# Patient Record
(demographics unavailable — no encounter records)

---

## 2024-11-12 NOTE — ASSESSMENT
[FreeTextEntry1] : 79-year-old male with a hx of DU and duodenal stricture. Pt says that he is doing well and has no GI symptoms.    Plan: Will cut back on the Pantoprazole to 40 mg qod.  I spent 10 min in the exam room with the pt.

## 2024-11-12 NOTE — HISTORY OF PRESENT ILLNESS
[FreeTextEntry1] : 79-year-old male with a hx of DU and duodenal stricture. Pt says that he is doing well and has no GI symptoms.

## 2025-02-19 NOTE — HISTORY OF PRESENT ILLNESS
[FreeTextEntry1] : COVID , he fell so was hospitalized, no meds  COVID VACCINE FULL.  HPI interval 2025: 79-year-old male presents today for follow up visit. Memory has declined. He lives with his brother part time. He is having delusions- thought someone after him. He was having difficulty with phone service- due to lack of payment. He is having a harder time paying bills. He is no longer driving. His brother is concerned with his hygiene and changing clothing. He has seen a psychiatrist in [facility] in past. No recent falls or hospitalizations. Sleep is poor- difficulty falling asleep.  He falls asleep often in car and daytime immediately after he eats. Appetite is very good- he eats a lot as per brother. As per patient no depression or anxiety- brother is concerned.   HPI: 79-year-old male presents today for initial cognitive evaluation with his brother. Memory loss for years but got worse in last several months. he lives with his brother. He does not have any children but was previously . He completed two years of college. He is repeating things many times.  He has poor concentration and focus. He wears clothing time and time again, No hallucinations or delusions. No known family history of Dementia. No changes in behavior or personality. No recent falls or hospitalizations. He is having worsening stm loss and needing things explained for clarification. He walks a lot.    PMH: HTN, HLD   -Memory: STM loss  -Speech: ok  -Orientation: ok -Praxis: ok  -Decision making/Executive fx/Multitasking: ok  -Sleep: on and off   -Appetite: good  -Motor symptoms: None   -B/B: None  -Psychiatric symptoms: None (brother disagrees)     -Functional status:   [place] in Activities of Daily Livin. Bathing/Showerin  2. Dressin  3. Toiletin   4. Transferrin 5. Continence: 1  6: Feedin TOTAL:  6     [place] Instrumental Activities of Daily Living:  A. Ability to Use Telephone: 1   B. Shoppin C. Food Preparation:1    D. Housekeepin   E. Laundry:  1 F. Transportation: 1    G. 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

## 2025-02-19 NOTE — DATA REVIEWED
[de-identified] : EXAM: [id number] - MR BRAIN DEMENTIA W 3D#  - ORDERED BY:  JEANNETTEANALI ESCALANTE   PROCEDURE DATE:  08/20/2024    INTERPRETATION:  HISTORY: ARIA Baseline scan.  TECHNIQUE: Multiplanar multisequence MRI brain was acquired without the administration of intravenous contrast, as per dedicated ARIA protocol.  3D reconstruction including segmentation and volumetric assessment was performed utilizing an independent, dedicated workstation with Formatta software for enhanced diagnostic analysis. Image documentation with archival was performed.  COMPARISON: None available.  FINDINGS:  There are scattered T2/FLAIR hyperintense foci in the subcortical and periventricular white matter, nonspecific finding, favored to reflect sequelae of mild chronic microvascular ischemic disease (Fazekas grade 1).  There is cortical sulcal prominence related to underlying brain parenchymal volume loss, which appears particularly pronounced in the mesial temporal lobes bilaterally.  No acute infarction, intracranial hemorrhage or mass.  There is no evidence of hydrocephalus. There are no extra-axial fluid collections. The skull base flow voids are present.  The visualized intraorbital contents are normal. There are scattered mild mucosal inflammatory changes of the paranasal sinuses. The mastoid air cells are grossly clear. The visualized soft tissues and osseous structures appear unremarkable.  3D post-processing with segmentation and volumetric analysis were technically successful. General morphology and age-related atrophy reports were generated and archived in the PACS for referring physician use. 3D volumetric assessment suggests decreased hippocampal volumes, at the 1st percentile for age bilaterally.  Note, comparison to proprietary normative data is provided by the Neuroquant software and will depend on the calculated intracranial volume. The calculated intracranial volume (ICV) for this study was 1348 mL.   IMPRESSION:  Number of microhemorrhages: None  Superficial siderosis (Y/N): No  White matter changes (Fazekas score): Fazekas 1  --- End of Report ---

## 2025-02-19 NOTE — REVIEW OF SYSTEMS
[Memory Lapses or Loss] : memory loss [Decr. Concentrating Ability] : decreased concentrating ability [Repeating Questions] : repeated questioning about recent events [Negative] : Heme/Lymph [Difficulty with Language] : no ~M difficulty with language [Changed Thought Patterns] : no change in thought patterns [Facial Weakness] : no facial weakness [Arm Weakness] : no arm weakness [Hand Weakness] : no hand weakness [Leg Weakness] : no leg weakness [Poor Coordination] : good coordination [Difficulty Writing] : no difficulty writing [Difficulties in Speech] : no speech difficulties [Numbness] : no numbness [Tingling] : no tingling [Abnormal Sensation] : no abnormal sensation [Hypersensitivity] : no hypersensitivity [Seizures] : no convulsions [Dizziness] : no dizziness [Fainting] : no fainting [Lightheadedness] : no lightheadedness [Vertigo] : no vertigo [Cluster Headache] : no cluster headache [Migraine Headache] : no migraine headache [Tension Headache] : no tension-type headache [Difficulty Walking] : no difficulty walking [Inability to Walk] : able to walk [Ataxia] : no ataxia [Frequent Falls] : not falling [Limping] : not limping

## 2025-02-19 NOTE — ASSESSMENT
[FreeTextEntry1] : Assessment: 79-year-old male with pmh htn and hld. Independent in adls and iadls. He is more forgetful and having STM loss and poor retention of information. MMSE 22/30, poor recall of words. Able to write a sentence correctly. He placed wrong time in clock.   Diagnostic Impression:     -memory loss -forgetfulness  Plan: -Rule out reversible vs vascular causes -psychiatry referral- delusions  -B vitamins, lyme serology, TFT, RPR -basic labs  -Educated on importance of lifestyle modifications such as daily exercise, healthy balanced diet and good sleep habits -Discussed changing living arrangements to safer options

## 2025-02-19 NOTE — PHYSICAL EXAM
[General Appearance - Alert] : alert [Oriented To Time, Place, And Person] : oriented to person, place, and time [Affect] : the affect was normal [Person] : oriented to person [Place] : oriented to place [Time] : oriented to time [Remote Intact] : remote memory intact [Span Intact] : the attention span was normal [Naming Objects] : no difficulty naming common objects [Repeating Phrases] : no difficulty repeating a phrase [Writing A Sentence] : no difficulty writing a sentence [Fluency] : fluency intact [Comprehension] : comprehension intact [Reading] : reading intact [Current Events] : adequate knowledge of current events [Past History] : adequate knowledge of personal past history [Vocabulary] : adequate range of vocabulary [Total Score ___ / 30] : the patient achieved a score of [unfilled] /30 [Date / Time ___ / 5] : date / time [unfilled] / 5 [Place ___ / 5] : place [unfilled] / 5 [Registration ___ / 3] : registration [unfilled] / 3 [Serial Sevens ___/5] : serial sevens [unfilled] / 5 [Naming 2 Objects ___ / 2] : naming two objects [unfilled] / 2 [Repeating a Sentence ___ / 1] : repeating a sentence [unfilled] / 1 [Writing a Sentence ___ / 1] : write sentence [unfilled] / 1 [3-stage Verbal Command ___ / 3] : three-stage verbal command [unfilled] / 3 [Written Command ___ / 1] : written command [unfilled] / 1 [Copy a Design ___ / 1] : copy a design [unfilled] / 1 [Recall ___ / 3] : recall [unfilled] / 3 [Cranial Nerves Optic (II)] : visual acuity intact bilaterally,  visual fields full to confrontation, pupils equal round and reactive to light [Cranial Nerves Oculomotor (III)] : extraocular motion intact [Cranial Nerves Trigeminal (V)] : facial sensation intact symmetrically [Cranial Nerves Facial (VII)] : face symmetrical [Cranial Nerves Vestibulocochlear (VIII)] : hearing was intact bilaterally [Cranial Nerves Glossopharyngeal (IX)] : tongue and palate midline [Cranial Nerves Accessory (XI - Cranial And Spinal)] : head turning and shoulder shrug symmetric [Cranial Nerves Hypoglossal (XII)] : there was no tongue deviation with protrusion [Motor Tone] : muscle tone was normal in all four extremities [Motor Strength] : muscle strength was normal in all four extremities [Abnormal Walk] : normal gait [2+] : Ankle jerk left 2+ [Sclera] : the sclera and conjunctiva were normal [PERRL With Normal Accommodation] : pupils were equal in size, round, reactive to light, with normal accommodation [Extraocular Movements] : extraocular movements were intact [Full Visual Field] : full visual field [Short Term Intact] : short term memory impaired [Registration Intact] : recent registration memory impaired [Concentration Intact] : a decrease in concentrating ability was observed [Visual Intact] : visual attention was ~T ~L decreased [Motor Strength Upper Extremities Bilaterally] : strength was normal in both upper extremities [Motor Strength Lower Extremities Bilaterally] : strength was normal in both lower extremities [Romberg's Sign] : Romberg's sign was negtive [Limited Balance] : balance was intact [Tremor] : no tremor present [FreeTextEntry4] : Mental Status Exam   Presidents: 2/5 Alternating Pattern: ok  Spiral: ok Clock: 2/3 Repetition: ok Trail A: B:  Fluency: A: 10 Animals: 15   Go-No-Go: ok   R/L discrimination on self and examiner: difficulty  Cross-line commands: difficulty Praxis: ok - Motor: ok -Dynamic/Luria: difficulty  -Ideomotor/Imitation: ok   -Ideational/writing/closing-in: ok  -Dressing: ok [FreeTextEntry7] : mild paratonia

## 2025-02-19 NOTE — REASON FOR VISIT
[Follow-Up: _____] : a [unfilled] follow-up visit [Family Member] : family member [FreeTextEntry1] : cogntiive decline

## 2025-03-05 NOTE — PHYSICAL EXAM
[Alert] : alert [No Acute Distress] : in no acute distress [Sclera] : the sclera and conjunctiva were normal [EOMI] : extraocular movements were intact [PERRL] : pupils were equal in size, round, and reactive to light [Normal Outer Ear/Nose] : the ears and nose were normal in appearance [Normal Appearance] : the appearance of the neck was normal [Supple] : the neck was supple [No Respiratory Distress] : no respiratory distress [No Acc Muscle Use] : no accessory muscle use [Respiration, Rhythm And Depth] : normal respiratory rhythm and effort [Auscultation Breath Sounds / Voice Sounds] : lungs were clear to auscultation bilaterally [Normal S1, S2] : normal S1 and S2 [Heart Rate And Rhythm] : heart rate was normal and rhythm regular [Edema] : edema was not present [Bowel Sounds] : normal bowel sounds [Abdomen Tenderness] : non-tender [Abdomen Soft] : soft [No Spinal Tenderness] : no spinal tenderness [Normal Gait] : normal gait [Involuntary Movements] : no involuntary movements were seen [Motor Tone] : muscle strength and tone were normal [Normal Color / Pigmentation] : normal skin color and pigmentation [No Focal Deficits] : no focal deficits [Normal Affect] : the affect was normal [Normal Mood] : the mood was normal [de-identified] : Right hand swelling and tenderness. Reduced ROM in R Shoulder

## 2025-03-05 NOTE — REVIEW OF SYSTEMS
[Fever] : no fever [Chills] : no chills [Feeling Poorly] : not feeling poorly [Feeling Tired] : not feeling tired [Eyesight Problems] : no eyesight problems [Earache] : no earache [Loss Of Hearing] : no hearing loss [Nosebleeds] : no nosebleeds [Nasal Discharge] : no nasal discharge [Sore Throat] : no sore throat [Heart Rate Is Slow] : the heart rate was not slow [Heart Rate Is Fast] : the heart rate was not fast [Chest Pain] : no chest pain [Palpitations] : no palpitations [Lower Ext Edema] : no extremity edema [Shortness Of Breath] : no shortness of breath [Wheezing] : no wheezing [Cough] : no cough [SOB on Exertion] : no shortness of breath during exertion [Abdominal Pain] : no abdominal pain [Vomiting] : no vomiting [Constipation] : no constipation [Diarrhea] : no diarrhea [Arthralgias] : arthralgias [Joint Swelling] : joint swelling [As noted in HPI] : as noted in HPI [As Noted in HPI] : as noted in HPI [Negative] : Heme/Lymph

## 2025-03-05 NOTE — ASSESSMENT
[FreeTextEntry1] : RTC in 1 week for cognitive evaluation.  Total time spent: 76 mins This includes chart review, patient assessment, discussion and collaboration with interdisciplinary team members, excluding time spent on separately billable services.  Time spent discussing advance care plannin mins  EKG obtained to assist in diagnosis and management of assessed problem(s).

## 2025-03-05 NOTE — HISTORY OF PRESENT ILLNESS
[Patient reported vision is abnormal] : Patient reported vision is abnormal [No falls in past year] : Patient reported no falls in the past year [Completely Independent] : Completely independent. [Smoke Detector] : smoke detector [Carbon Monoxide Detector] : carbon monoxide detector [Grab Bars] : grab bars [Night Light] : night light [Anti-Slip Measures] : anti-slip measures [NO] : No [Little interest or pleasure doing things] : 1) Little interest or pleasure doing things [Feeling down, depressed, or hopeless] : 2) Feeling down, depressed, or hopeless [0] : 2) Feeling down, depressed, or hopeless: Not at all (0) [Designated Healthcare Proxy] : Designated healthcare proxy [Name: ___] : Health Care Proxy's Name: [unfilled]  [Relationship: ___] : Relationship: [unfilled] [Patient reported hearing was normal] : Patient reported hearing was normal [Patient reported Patient reported dental screening is normal] : Patient reported dental screening is normal [Patient is independent with] : bathing [] : managing medications [Independent] : managing finances [PHQ-2 Negative - No further assessment needed] : PHQ-2 Negative - No further assessment needed [I have developed a follow-up plan documented below in the note.] : I have developed a follow-up plan documented below in the note. [With Patient/Caregiver] : , with patient/caregiver [Reviewed updated] : Reviewed, updated [I will adhere to the patient's wishes.] : I will adhere to the patient's wishes. [Time Spent: ___ minutes] : Time Spent: [unfilled] minutes [FreeTextEntry1] : 78 y/o M with PMHx of HTN, HLD, duodenal strictures/ulcers, dementia presenting to the practice to establish care.   Accompanied by his brother Ten.   Previous PCP: Dr. Azael Shi [address] [place] [address] [phone]  RUE pain: Patient complains of R hand pain and swelling x 1 week and R Shoulder pain and reduced mobility x 1 month. Patient does not recall any trauma to the hand or shoulder. Brother endorses that he has been having trouble remembering events so it is possible that he does not remember the circumstances behind the swelling in his hand and pain in his shoulder.  HCM:  Discussed PCV and Shingrix with patient, he is unsure if he received these vaccines in the past and will bring his vaccination records at next visit. He wants to defer vaccinations to next visit.   HTN:  On Hyzaar 100-12.5 mg daily, BP in office was 165/75. Will check again at next visit and will add additional HTN medication. Denies chest pain, palpitations, SOB. No cardiac history otherwise. Has a history of bradycardia only.  HLD:  Continue lipitor 20 mg daily.   MCI: Following with neuro. Independant with I/ADL's, experiencing forgetfulness, short-term memory loss, poor retention of information. MMSE done with neuro at 21/30 in 2024. Had initial dementia w/u with neuro. Considered vascular vs reversible causes. Referred to psych, neuropsych. Neuropsych evaluation with primarily frontal systems dysfunction + additional mesial temporal dysfunction all consistent with MCI.   MR brain notable for scattered T2/FLAIR hyperintense foci in the subcortical and periventricular white matter, nonspecific finding, favored to reflect sequelae of mild chronic microvascular ischemic disease (Fazekas grade 1). There is cortical sulcal prominence related to underlying brain parenchymal volume loss, which appears particularly pronounced in the mesial temporal lobes bilaterally.  No acute infarction, intracranial hemorrhage or mass.  There is no evidence of hydrocephalus. There are no extra-axial fluid collections. The skull base flow voids are present.  Deconditioning: Referred to PT.  Duodenal stricture/ulcer: Following with GI. On pantoprazole 40 mg daily. [TextBox_37] : referral provided to ophtho  [Shower Chair] : no shower chair [Driving Concerns] : not driving or driving without noted concerns [[id number]] : 0 [AdvancecareDate] : 03/05/2025 [FreeTextEntry4] : Patient states that he would want his brother to be his HCP. Form signed. Discussed MOLST, he states that he will think about it.

## 2025-03-05 NOTE — PHYSICAL EXAM
[Alert] : alert [No Acute Distress] : in no acute distress [EOMI] : extraocular movements were intact [Sclera] : the sclera and conjunctiva were normal [PERRL] : pupils were equal in size, round, and reactive to light [Normal Outer Ear/Nose] : the ears and nose were normal in appearance [Normal Appearance] : the appearance of the neck was normal [Supple] : the neck was supple [No Respiratory Distress] : no respiratory distress [No Acc Muscle Use] : no accessory muscle use [Respiration, Rhythm And Depth] : normal respiratory rhythm and effort [Auscultation Breath Sounds / Voice Sounds] : lungs were clear to auscultation bilaterally [Normal S1, S2] : normal S1 and S2 [Heart Rate And Rhythm] : heart rate was normal and rhythm regular [Edema] : edema was not present [Bowel Sounds] : normal bowel sounds [Abdomen Tenderness] : non-tender [Abdomen Soft] : soft [No Spinal Tenderness] : no spinal tenderness [Normal Gait] : normal gait [Involuntary Movements] : no involuntary movements were seen [Motor Tone] : muscle strength and tone were normal [Normal Color / Pigmentation] : normal skin color and pigmentation [No Focal Deficits] : no focal deficits [Normal Affect] : the affect was normal [Normal Mood] : the mood was normal [de-identified] : Right hand swelling and tenderness. Reduced ROM in R Shoulder

## 2025-03-05 NOTE — HISTORY OF PRESENT ILLNESS
[Patient reported vision is abnormal] : Patient reported vision is abnormal [No falls in past year] : Patient reported no falls in the past year [Completely Independent] : Completely independent. [Smoke Detector] : smoke detector [Carbon Monoxide Detector] : carbon monoxide detector [Grab Bars] : grab bars [Night Light] : night light [Anti-Slip Measures] : anti-slip measures [NO] : No [Little interest or pleasure doing things] : 1) Little interest or pleasure doing things [Feeling down, depressed, or hopeless] : 2) Feeling down, depressed, or hopeless [0] : 2) Feeling down, depressed, or hopeless: Not at all (0) [Designated Healthcare Proxy] : Designated healthcare proxy [Name: ___] : Health Care Proxy's Name: [unfilled]  [Relationship: ___] : Relationship: [unfilled] [Patient reported hearing was normal] : Patient reported hearing was normal [Patient reported Patient reported dental screening is normal] : Patient reported dental screening is normal [Patient is independent with] : bathing [] : managing medications [Independent] : managing finances [PHQ-2 Negative - No further assessment needed] : PHQ-2 Negative - No further assessment needed [I have developed a follow-up plan documented below in the note.] : I have developed a follow-up plan documented below in the note. [With Patient/Caregiver] : , with patient/caregiver [Reviewed updated] : Reviewed, updated [I will adhere to the patient's wishes.] : I will adhere to the patient's wishes. [Time Spent: ___ minutes] : Time Spent: [unfilled] minutes [FreeTextEntry1] : 80 y/o M with PMHx of HTN, HLD, duodenal strictures/ulcers, dementia presenting to the practice to establish care.   Accompanied by his brother Ten.   Previous PCP: Dr. Azael Shi [address] [place] [address] [phone]  RUE pain: Patient complains of R hand pain and swelling x 1 week and R Shoulder pain and reduced mobility x 1 month. Patient does not recall any trauma to the hand or shoulder. Brother endorses that he has been having trouble remembering events so it is possible that he does not remember the circumstances behind the swelling in his hand and pain in his shoulder.  HCM:  Discussed PCV and Shingrix with patient, he is unsure if he received these vaccines in the past and will bring his vaccination records at next visit. He wants to defer vaccinations to next visit.   HTN:  On Hyzaar 100-12.5 mg daily, BP in office was 165/75. Will check again at next visit and will add additional HTN medication. Denies chest pain, palpitations, SOB. No cardiac history otherwise. Has a history of bradycardia only.  HLD:  Continue lipitor 20 mg daily.   MCI: Following with neuro. Independant with I/ADL's, experiencing forgetfulness, short-term memory loss, poor retention of information. MMSE done with neuro at 21/30 in 2024. Had initial dementia w/u with neuro. Considered vascular vs reversible causes. Referred to psych, neuropsych. Neuropsych evaluation with primarily frontal systems dysfunction + additional mesial temporal dysfunction all consistent with MCI.   MR brain notable for scattered T2/FLAIR hyperintense foci in the subcortical and periventricular white matter, nonspecific finding, favored to reflect sequelae of mild chronic microvascular ischemic disease (Fazekas grade 1). There is cortical sulcal prominence related to underlying brain parenchymal volume loss, which appears particularly pronounced in the mesial temporal lobes bilaterally.  No acute infarction, intracranial hemorrhage or mass.  There is no evidence of hydrocephalus. There are no extra-axial fluid collections. The skull base flow voids are present.  Deconditioning: Referred to PT.  Duodenal stricture/ulcer: Following with GI. On pantoprazole 40 mg daily. [TextBox_37] : referral provided to ophtho  [Shower Chair] : no shower chair [Driving Concerns] : not driving or driving without noted concerns [[id number]] : 0 [AdvancecareDate] : 03/05/2025 [FreeTextEntry4] : Patient states that he would want his brother to be his HCP. Form signed. Discussed MOLST, he states that he will think about it.

## 2025-03-07 NOTE — REASON FOR VISIT
[Initial Consultation] : an initial consultation for [Hand Pain] : hand pain [Family Member] : family member

## 2025-03-12 NOTE — PHYSICAL EXAM
[de-identified] : Patient is alert, oriented and in no acute distress. Affect and general appearance are normal and patient is able to answer questions appropriately.  Neurologic: Median, ulnar, and radial motor and sensory are intact.  Skin: No cyanosis, clubbing, edema or rashes.  Vascular: Radial pulses intact.  Lymphatic: No streaking or epitrochlear adenopathy.  Right palm: Extensive edematous changes. Pain with flexion and extension. There is pain at A1 Pulley. There is some swelling.

## 2025-03-12 NOTE — ASSESSMENT
[FreeTextEntry1] : Extensive discussion with patient and brother. Concern for fluctuance and abscess necessitating incision in office today.   Will need to perform daily soaks. Abx prescribed. Follow-up on Friday for evaluation

## 2025-03-12 NOTE — PHYSICAL EXAM
[de-identified] : Patient is alert, oriented and in no acute distress. Affect and general appearance are normal and patient is able to answer questions appropriately.  Neurologic: Median, ulnar, and radial motor and sensory are intact.  Skin: No cyanosis, clubbing, edema or rashes.  Vascular: Radial pulses intact.  Lymphatic: No streaking or epitrochlear adenopathy.  Right palm: Extensive edematous changes. Pain with flexion and extension. There is pain at A1 Pulley. There is some swelling.

## 2025-03-12 NOTE — HISTORY OF PRESENT ILLNESS
[FreeTextEntry1] : Patient is a 79 year old male who presents today with right hand pain and swelling x 3 weeks. He denies any inciting event and states that he woke up one day to diffuse and spontaneous swelling and pain of the right hand, worse over the middle finger. Over the course of the past 3 weeks, he endorses worsening of symptoms. He denies any numbness, tingling or lacerations to the hand.

## 2025-03-12 NOTE — PROCEDURE
[FreeTextEntry1] : Ultrasound obtained of the right middle finger evidence of extensive soft tissue edema. no vascular enhancement. Underlying bones and tendons appear normal.  Digit was sterilely prepped. Sharp dissection made over area of maximum fluctuance. Blunt dissection through subcutaneous tissues.  2 cc of purulence was encountered. Wound was copiously irrigated. Patient tolerated the procedure well without any complications.

## 2025-03-12 NOTE — REVIEW OF SYSTEMS
[Right] : right [Arthralgia] : no arthralgia [Joint Pain] : no joint pain [Joint Stiffness] : joint stiffness [Joint Swelling] : joint swelling [Negative] : Allergic/Immunologic

## 2025-03-13 NOTE — PHYSICAL EXAM
[de-identified] : Constitutional: Alert and in no acute distress. Psychiatric: Oriented to person, place, and time. Insight and judgement were intact and the affect was normal. Cardiovascular: Regular rate assessed through peripheral pulses. Pulmonary: Nonlabored breathing on room air. Lymphatics: No peripheral lymphadenopathy appreciated.  Musculoskeletal: Right hand there is decreased swelling. Increased ROM. Incision without erythema.

## 2025-03-13 NOTE — PHYSICAL EXAM
[de-identified] : Constitutional: Alert and in no acute distress. Psychiatric: Oriented to person, place, and time. Insight and judgement were intact and the affect was normal. Cardiovascular: Regular rate assessed through peripheral pulses. Pulmonary: Nonlabored breathing on room air. Lymphatics: No peripheral lymphadenopathy appreciated.  Musculoskeletal: Right hand there is decreased swelling. Increased ROM. Incision without erythema.

## 2025-03-13 NOTE — HISTORY OF PRESENT ILLNESS
[FreeTextEntry1] : The patient is doing well since the last visit. He has kept the dressing clean and intact and has started his been taking the prescribed antibiotic. He states that the pain and swelling have improved and feels that he has more ROM to the right hand. He denies any fevers or chills.

## 2025-04-11 NOTE — PHYSICAL EXAM
[de-identified] : Constitutional: Alert and in no acute distress. Psychiatric: Oriented to person, place, and time. Insight and judgement were intact and the affect was normal. Cardiovascular: Regular rate assessed through peripheral pulses. Pulmonary: Nonlabored breathing on room air. Lymphatics: No peripheral lymphadenopathy appreciated.  Musculoskeletal: Right hand: Incision is well healed and without erythema. there is decreased swelling. Increased ROM.

## 2025-04-11 NOTE — PHYSICAL EXAM
[de-identified] : Constitutional: Alert and in no acute distress. Psychiatric: Oriented to person, place, and time. Insight and judgement were intact and the affect was normal. Cardiovascular: Regular rate assessed through peripheral pulses. Pulmonary: Nonlabored breathing on room air. Lymphatics: No peripheral lymphadenopathy appreciated.  Musculoskeletal: Right hand: Incision is well healed and without erythema. there is decreased swelling. Increased ROM.

## 2025-04-11 NOTE — PHYSICAL EXAM
[de-identified] : Constitutional: Alert and in no acute distress. Psychiatric: Oriented to person, place, and time. Insight and judgement were intact and the affect was normal. Cardiovascular: Regular rate assessed through peripheral pulses. Pulmonary: Nonlabored breathing on room air. Lymphatics: No peripheral lymphadenopathy appreciated.  Musculoskeletal: Right hand: Incision is well healed and without erythema. there is decreased swelling. Increased ROM.

## 2025-04-11 NOTE — HISTORY OF PRESENT ILLNESS
[FreeTextEntry1] : The patient is doing well since the last visit. He admits to finishing his course of prescribed antibiotics. He states that the pain and swelling have improved and feels that he has more ROM to the right hand. He denies any fevers or chills.

## 2025-05-20 NOTE — PHYSICAL EXAM
[Alert] : alert [Sclera] : the sclera and conjunctiva were normal [EOMI] : extraocular movements were intact [PERRL] : pupils were equal in size, round, and reactive to light [Normal Outer Ear/Nose] : the ears and nose were normal in appearance [Normal Appearance] : the appearance of the neck was normal [Supple] : the neck was supple [No Respiratory Distress] : no respiratory distress [No Acc Muscle Use] : no accessory muscle use [Respiration, Rhythm And Depth] : normal respiratory rhythm and effort [Auscultation Breath Sounds / Voice Sounds] : lungs were clear to auscultation bilaterally [Normal S1, S2] : normal S1 and S2 [Heart Rate And Rhythm] : heart rate was normal and rhythm regular [Edema] : edema was not present [Bowel Sounds] : normal bowel sounds [Abdomen Tenderness] : non-tender [Abdomen Soft] : soft [No Spinal Tenderness] : no spinal tenderness [Involuntary Movements] : no involuntary movements were seen [Motor Tone] : muscle strength and tone were normal [Normal Color / Pigmentation] : normal skin color and pigmentation [No Focal Deficits] : no focal deficits [Normal Affect] : the affect was normal [Normal Mood] : the mood was normal [___ / 5] : Visuospatial / Executive: [unfilled] / 5 [0 / 0] : Memory: 0 / 0 [___ / 3] : Attention (Serial 7 subtraction): [unfilled] / 3 [___ / 1] : Fluency: [unfilled] / 1 [___ / 2] : Abstraction: [unfilled] / 2 [___ / 5] : Delayed Recall: [unfilled] / 5 [___ / 6] : Orientation: [unfilled] / 6 [Normal Gait] : abnormal gait [de-identified] : elderly male, slow speech  [de-identified] :  Reduced ROM in R Shoulder. Ataxic shuffled gait.  [MocaTotal] : 17 [FreeTextEntry1] : 3/11/25

## 2025-05-20 NOTE — HISTORY OF PRESENT ILLNESS
[Patient reported hearing was normal] : Patient reported hearing was normal [Patient reported vision is abnormal] : Patient reported vision is abnormal [Patient reported Patient reported dental screening is normal] : Patient reported dental screening is normal [No falls in past year] : Patient reported no falls in the past year [Patient is independent with] : bathing [Independent] : transferring/mobility [Completely Independent] : Completely independent. [Full assistance needed] : Assistance needed managing medications [FAST Score: ____] : Functional Assessment Scale (FAST) Score: [unfilled] [Smoke Detector] : smoke detector [Carbon Monoxide Detector] : carbon monoxide detector [Grab Bars] : grab bars [Night Light] : night light [Anti-Slip Measures] : anti-slip measures [NO] : No [Little interest or pleasure doing things] : 1) Little interest or pleasure doing things [Feeling down, depressed, or hopeless] : 2) Feeling down, depressed, or hopeless [0] : 2) Feeling down, depressed, or hopeless: Not at all (0) [PHQ-2 Negative - No further assessment needed] : PHQ-2 Negative - No further assessment needed [I have developed a follow-up plan documented below in the note.] : I have developed a follow-up plan documented below in the note. [] : 0 [With Patient/Caregiver] : , with patient/caregiver [Reviewed updated] : Reviewed, updated [Designated Healthcare Proxy] : Designated healthcare proxy [Name: ___] : Health Care Proxy's Name: [unfilled]  [Relationship: ___] : Relationship: [unfilled] [I will adhere to the patient's wishes.] : I will adhere to the patient's wishes. [Time Spent: ___ minutes] : Time Spent: [unfilled] minutes [FreeTextEntry1] : 78 y/o M with PMHx of HTN, HLD, duodenal strictures/ulcers, dementia presenting for a follow up for dementia care.  Accompanied by his brother Ten who assisted in hx intake due to dementia.    #dementia  - Mr. MARGOTH LEONG stated he does not think his memory is a problem  -Ten expressed concerns about his memory, delusions, lack of cleanliness "sloppy" -reports this has been going on for a long time  -delusions that he thinks that what he see's on TV being is real -reports he is sleeping on and off throughout the day  -enjoys walking  -lives with Ten part-time "increasingly more and more" -CSDD 3 3/11/25 -[facility] 17/30 3/11/25 -has left stove on  -payed 2 months of rents, forgetting to pay bills  -forgot to pay phone services  -spending most of the week with Ten -Ten reports worsening memory loss "he is forgetting to take his meds" -not using pill box -forgetting to take meds -not driving  -denies behavioral disturbances  -occasionally thinks that when he misplaces items, thinks someone is stealing them -denies agitation  -sleeping well  -gaining weight -denies falls/hospitalizations  Following with neuro. Independant with I/ADL's, experiencing forgetfulness, short-term memory loss, poor retention of information. MMSE done with neuro at 21/30 in 2024. Had initial dementia w/u with neuro. Considered vascular vs reversible causes. Referred to psych, neuropsych. Neuropsych evaluation with primarily frontal systems dysfunction + additional mesial temporal dysfunction all consistent with MCI.   MR brain notable for scattered T2/FLAIR hyperintense foci in the subcortical and periventricular white matter, nonspecific finding, favored to reflect sequelae of mild chronic microvascular ischemic disease (Fazekas grade 1). There is cortical sulcal prominence related to underlying brain parenchymal volume loss, which appears particularly pronounced in the mesial temporal lobes bilaterally.  No acute infarction, intracranial hemorrhage or mass.  There is no evidence of hydrocephalus. There are no extra-axial fluid collections. The skull base flow voids are present.  right hand wound: -pt. thinks it occurred 2/2 to a fall, "unsure"   -taking cephalexin  -doing hand therapy 2x/week   HTN:  On Hyzaar 100-12.5 mg daily, BP in office was 165/75. Will check again at next visit and will add additional HTN medication. Denies chest pain, palpitations, SOB. No cardiac history otherwise. Has a history of bradycardia only.  HLD:  Continue lipitor 20 mg daily.    Deconditioning: Referred to PT.  Duodenal stricture/ulcer: Following with GI. On pantoprazole 40 mg daily. Denies pain. Denies acute visits/falls. Denies HA/dizziness, SOB/CP, abdominal pain, dysuria, reports daily BMs regular.   HCM:  Discussed PCV and Shingrix with patient, he is unsure if he received these vaccines in the past and will bring his vaccination records at next visit. He wants to defer vaccinations to next visit.    Plan -concerns for medication compliance/supervision, counseled  -financial concern, advised to meet with  -referral to SW today   Suspect Medications (associated with mental status changes): no Recent hospitalization/ ED Visits/ Nursing Home Stays:no  Social History: Primary Language: English  Marital Status: single  Children: no  Education: recently retired  Occupation: sales  Activity/Exercise: Alcohol: occasional wine, less than 1x/week  Sexually active:  Driving Habits: not driving  Firearms: no   Living Situation: Housing (stairs/levels): apartment  Length at Residence: Lives with: Caregivers (non-paid and paid): Safety Concerns: none  Wandering: no  Falls:2? Fear of Falling: no   Financial Situation:  Advance Directives: HCP/Advance Directives/Living will: Ten Leong HCP/Alternate Decision Maker: MARIA ISABEL: educated on MOLST  What matters most? " I feel very grateful to have my brother"   Previous PCP: Dr. Azael Shi [address] [place] [address] [phone]   [TextBox_37] : referral provided to ophtho  [Shower Chair] : no shower chair [Driving Concerns] : not driving or driving without noted concerns [de-identified] : 6 [de-identified] : has left the stove on [FreeTextEntry7] : needs reminders  [de-identified] : 2 [[id number]] : 0 [CornellScale] : 3 3/11/25 [AdvancecareDate] : 5/19/2025 [FreeTextEntry4] : Patient states that he would want his brother to be his HCP. Form signed. Discussed MOLST, he states that he will think about it.

## 2025-05-20 NOTE — PHYSICAL EXAM
[Alert] : alert [Sclera] : the sclera and conjunctiva were normal [EOMI] : extraocular movements were intact [PERRL] : pupils were equal in size, round, and reactive to light [Normal Outer Ear/Nose] : the ears and nose were normal in appearance [Normal Appearance] : the appearance of the neck was normal [Supple] : the neck was supple [No Respiratory Distress] : no respiratory distress [No Acc Muscle Use] : no accessory muscle use [Respiration, Rhythm And Depth] : normal respiratory rhythm and effort [Auscultation Breath Sounds / Voice Sounds] : lungs were clear to auscultation bilaterally [Normal S1, S2] : normal S1 and S2 [Heart Rate And Rhythm] : heart rate was normal and rhythm regular [Edema] : edema was not present [Bowel Sounds] : normal bowel sounds [Abdomen Tenderness] : non-tender [Abdomen Soft] : soft [No Spinal Tenderness] : no spinal tenderness [Involuntary Movements] : no involuntary movements were seen [Motor Tone] : muscle strength and tone were normal [Normal Color / Pigmentation] : normal skin color and pigmentation [No Focal Deficits] : no focal deficits [Normal Affect] : the affect was normal [Normal Mood] : the mood was normal [___ / 5] : Visuospatial / Executive: [unfilled] / 5 [0 / 0] : Memory: 0 / 0 [___ / 3] : Attention (Serial 7 subtraction): [unfilled] / 3 [___ / 1] : Fluency: [unfilled] / 1 [___ / 2] : Abstraction: [unfilled] / 2 [___ / 5] : Delayed Recall: [unfilled] / 5 [___ / 6] : Orientation: [unfilled] / 6 [Normal Gait] : abnormal gait [de-identified] : elderly male, slow speech  [de-identified] :  Reduced ROM in R Shoulder. Ataxic shuffled gait.  [MocaTotal] : 17 [FreeTextEntry1] : 3/11/25

## 2025-05-20 NOTE — HISTORY OF PRESENT ILLNESS
[Patient reported hearing was normal] : Patient reported hearing was normal [Patient reported vision is abnormal] : Patient reported vision is abnormal [Patient reported Patient reported dental screening is normal] : Patient reported dental screening is normal [No falls in past year] : Patient reported no falls in the past year [Patient is independent with] : bathing [Independent] : transferring/mobility [Completely Independent] : Completely independent. [Full assistance needed] : Assistance needed managing medications [FAST Score: ____] : Functional Assessment Scale (FAST) Score: [unfilled] [Smoke Detector] : smoke detector [Carbon Monoxide Detector] : carbon monoxide detector [Grab Bars] : grab bars [Night Light] : night light [Anti-Slip Measures] : anti-slip measures [NO] : No [Little interest or pleasure doing things] : 1) Little interest or pleasure doing things [Feeling down, depressed, or hopeless] : 2) Feeling down, depressed, or hopeless [0] : 2) Feeling down, depressed, or hopeless: Not at all (0) [PHQ-2 Negative - No further assessment needed] : PHQ-2 Negative - No further assessment needed [I have developed a follow-up plan documented below in the note.] : I have developed a follow-up plan documented below in the note. [] : 0 [With Patient/Caregiver] : , with patient/caregiver [Reviewed updated] : Reviewed, updated [Designated Healthcare Proxy] : Designated healthcare proxy [Name: ___] : Health Care Proxy's Name: [unfilled]  [Relationship: ___] : Relationship: [unfilled] [I will adhere to the patient's wishes.] : I will adhere to the patient's wishes. [Time Spent: ___ minutes] : Time Spent: [unfilled] minutes [FreeTextEntry1] : 80 y/o M with PMHx of HTN, HLD, duodenal strictures/ulcers, dementia presenting for a follow up for dementia care.  Accompanied by his brother Ten who assisted in hx intake due to dementia.    #dementia  - Mr. MARGOTH LEONG stated he does not think his memory is a problem  -Ten expressed concerns about his memory, delusions, lack of cleanliness "sloppy" -reports this has been going on for a long time  -delusions that he thinks that what he see's on TV being is real -reports he is sleeping on and off throughout the day  -enjoys walking  -lives with Ten part-time "increasingly more and more" -CSDD 3 3/11/25 -[facility] 17/30 3/11/25 -has left stove on  -payed 2 months of rents, forgetting to pay bills  -forgot to pay phone services  -spending most of the week with Ten -Ten reports worsening memory loss "he is forgetting to take his meds" -not using pill box -forgetting to take meds -not driving  -denies behavioral disturbances  -occasionally thinks that when he misplaces items, thinks someone is stealing them -denies agitation  -sleeping well  -gaining weight -denies falls/hospitalizations  Following with neuro. Independant with I/ADL's, experiencing forgetfulness, short-term memory loss, poor retention of information. MMSE done with neuro at 21/30 in 2024. Had initial dementia w/u with neuro. Considered vascular vs reversible causes. Referred to psych, neuropsych. Neuropsych evaluation with primarily frontal systems dysfunction + additional mesial temporal dysfunction all consistent with MCI.   MR brain notable for scattered T2/FLAIR hyperintense foci in the subcortical and periventricular white matter, nonspecific finding, favored to reflect sequelae of mild chronic microvascular ischemic disease (Fazekas grade 1). There is cortical sulcal prominence related to underlying brain parenchymal volume loss, which appears particularly pronounced in the mesial temporal lobes bilaterally.  No acute infarction, intracranial hemorrhage or mass.  There is no evidence of hydrocephalus. There are no extra-axial fluid collections. The skull base flow voids are present.  right hand wound: -pt. thinks it occurred 2/2 to a fall, "unsure"   -taking cephalexin  -doing hand therapy 2x/week   HTN:  On Hyzaar 100-12.5 mg daily, BP in office was 165/75. Will check again at next visit and will add additional HTN medication. Denies chest pain, palpitations, SOB. No cardiac history otherwise. Has a history of bradycardia only.  HLD:  Continue lipitor 20 mg daily.    Deconditioning: Referred to PT.  Duodenal stricture/ulcer: Following with GI. On pantoprazole 40 mg daily. Denies pain. Denies acute visits/falls. Denies HA/dizziness, SOB/CP, abdominal pain, dysuria, reports daily BMs regular.   HCM:  Discussed PCV and Shingrix with patient, he is unsure if he received these vaccines in the past and will bring his vaccination records at next visit. He wants to defer vaccinations to next visit.    Plan -concerns for medication compliance/supervision, counseled  -financial concern, advised to meet with  -referral to SW today   Suspect Medications (associated with mental status changes): no Recent hospitalization/ ED Visits/ Nursing Home Stays:no  Social History: Primary Language: English  Marital Status: single  Children: no  Education: recently retired  Occupation: sales  Activity/Exercise: Alcohol: occasional wine, less than 1x/week  Sexually active:  Driving Habits: not driving  Firearms: no   Living Situation: Housing (stairs/levels): apartment  Length at Residence: Lives with: Caregivers (non-paid and paid): Safety Concerns: none  Wandering: no  Falls:2? Fear of Falling: no   Financial Situation:  Advance Directives: HCP/Advance Directives/Living will: Ten Leong HCP/Alternate Decision Maker: MARIA ISABEL: educated on MOLST  What matters most? " I feel very grateful to have my brother"   Previous PCP: Dr. Azael Shi [address] [place] [address] [phone]   [TextBox_37] : referral provided to ophtho  [Shower Chair] : no shower chair [Driving Concerns] : not driving or driving without noted concerns [de-identified] : 6 [de-identified] : has left the stove on [FreeTextEntry7] : needs reminders  [de-identified] : 2 [[id number]] : 0 [CornellScale] : 3 3/11/25 [AdvancecareDate] : 5/19/2025 [FreeTextEntry4] : Patient states that he would want his brother to be his HCP. Form signed. Discussed MOLST, he states that he will think about it.

## 2025-06-12 NOTE — HISTORY OF PRESENT ILLNESS
[No falls in past year] : Patient reported no falls in the past year [Completely Independent] : Completely independent. [Independent] : transferring/mobility [Completely Dependent] : Completely dependent. [Full assistance needed] : Assistance needed managing medications [FAST Score: ____] : Functional Assessment Scale (FAST) Score: [unfilled] [Smoke Detector] : smoke detector [Carbon Monoxide Detector] : carbon monoxide detector [Grab Bars] : grab bars [Night Light] : night light [Anti-Slip Measures] : anti-slip measures [NO] : No [Little interest or pleasure doing things] : 1) Little interest or pleasure doing things [Feeling down, depressed, or hopeless] : 2) Feeling down, depressed, or hopeless [0] : 2) Feeling down, depressed, or hopeless: Not at all (0) [PHQ-2 Negative - No further assessment needed] : PHQ-2 Negative - No further assessment needed [I have developed a follow-up plan documented below in the note.] : I have developed a follow-up plan documented below in the note. [Mild] : Stage: Mild [Worse] : Status: Worse [Memory Lapses Or Loss] : worsened memory impairment [Patient Observed To Be Agitated] : stable agitation [Hostility Toward Caregivers] : denies aggression [Sleep Disturbances] : worsened sleep disturbances [] : stable wandering [Fixed Beliefs Contradicted By Reality (Delusions)] : stable delusions [Difficulty Finding Desired Words] : denies difficulty finding desired words [FreeTextEntry1] : 80 y/o M with PMHx of HTN, HLD, duodenal strictures/ulcers, dementia, ?schizophrenia treated at [facility] presenting to the practice for a transition of care visit.  Accompanied by his brother Ten.   Previous PCP: Dr. Azael Shi [address] [place] [address] [phone]  Back pain: Notes in the lower back, mostly in the AM. He sleeps in a recliner and has been doing so for many years. He denies apnea and SOB. Brother notes that he snores. Unable to tell me what happens when he lays supine. States that the chair is more comfortable for him. Denies neuropathy in the lower extremities, urinary/bowel incontinence, groin anesthesia. Open to trying physical therapy at the gym.  RUE pain: Saw ortho, X-rays okay, going to hand therapy. Feels his hand pain has improved. Takes tylenol extra strength 1 g daily only. Has not been using volteran gel BID. Denies neuropathy, numbness/tingling, focal neuro signs.  HTN:  On Hyzaar 100-12.5 mg daily, BP was elevated to systolic 160's for me today and repeat went down to 140's similar to previous visit. There is a question whether he is taking his medications perfectly. Notes that he has white coat HTN. Denies chest pain, palpitations, SOB.  HLD:  On atorvastatin 20 mg QHS  Dementia: Following with neuro. Independant with I/ADL's, experiencing forgetfulness, short-term memory loss, poor retention of information. MMSE done with neuro at 21/30 in 2024. Had initial dementia w/u with neuro. Considered vascular vs reversible causes. Referred to psych, neuropsych. Neuropsych evaluation with primarily frontal systems dysfunction + additional mesial temporal dysfunction all consistent with MCI.   MR brain notable for scattered T2/FLAIR hyperintense foci in the subcortical and periventricular white matter, nonspecific finding, favored to reflect sequelae of mild chronic microvascular ischemic disease (Fazekas grade 1). There is cortical sulcal prominence related to underlying brain parenchymal volume loss, which appears particularly pronounced in the mesial temporal lobes bilaterally. No acute infarction, intracranial hemorrhage or mass. There is no evidence of hydrocephalus. There are no extra-axial fluid collections. The skull base flow voids are present.  MOCA done with NP Christina with our practice is 17/30. On further interview, brother notes that he is having issues paying for bills, leaving the stove on, unable to drive, forgetting to take his medications/not using his pill box. There are hallucinations consistent with his schizophrenia diagnosis but there are no episodes of agitation/aggression as per brother. He is sleeping well and eating well. FAST stage 4-5, late onset Alzheimer's type. Deferred donepezil at cognitive evaluation and deferring today. Recent episode of amnesia, he went to [facility] and bought some groceries but came home with a receipt with stuff that he did not come home with and was unable to tell his brother what happened and he does not remember anything today. He also tried to set up a meeting with his friend for dinner but was nowhere to be found when it was time for the dinner. Forgetting to use and take his phone with him.  ?Schizophrenia: Was previously seen at [facility] in the past for psychotic episodes, followed a psychiatrist at that time. No ongoing psych support but did visit [facility] last year due to acute psychosis. He experiences delusions such as people coming into his apartment and stealing things. Connected with SW at previous visit with me to get him connected with angie-psych at [facility] but we waited until this visit to further discuss. He is supported by his family for transportation and lives in [place] with his brother although he is officially living at [facility] in [place]. Recent episode of going to [facility] and had an amnesia episode, had a large charge on his credit card and was unable to tell us what he bought.  Duodenal stricture/ulcer: Following with GI. On pantoprazole 40 mg daily but not completely compliant. No acute sx noted.  HCM:  Discussed PCV and Shingrix with patient, he is unsure if he received these vaccines in the past and will bring his vaccination records at next visit. He wants to defer vaccinations to next visit.  [Shower Chair] : no shower chair [Driving Concerns] : not driving or driving without noted concerns [de-identified] : Forgetting to take his phone and use his phone [de-identified] : Recent amnesia episode [de-identified] : Neglecting [de-identified] : Neglecting [de-identified] : Neglecting [FreeTextEntry6] : No longer drives [FreeTextEntry7] : Forgetting to take medications [de-identified] : Forgetting to pay bills [[id number]] : 0